# Patient Record
Sex: FEMALE | Race: WHITE
[De-identification: names, ages, dates, MRNs, and addresses within clinical notes are randomized per-mention and may not be internally consistent; named-entity substitution may affect disease eponyms.]

---

## 2020-03-28 ENCOUNTER — HOSPITAL ENCOUNTER (OUTPATIENT)
Dept: HOSPITAL 53 - M SLEEP | Age: 30
End: 2020-03-28
Attending: NURSE PRACTITIONER
Payer: COMMERCIAL

## 2020-03-28 DIAGNOSIS — G47.33: Primary | ICD-10-CM

## 2020-03-31 NOTE — SLEEPCENT
DATE OF STUDY: 03/28/2020

 

NOCTURNAL POLYSOMNOGRAPHY C-PAP TITRATION

 

ORDERED BY:  JUDAH Fulton

 

Nocturnal polysomnography was performed for the titration of pressure therapy in

this patient with a clinical diagnosis of obstructive sleep apnea syndrome

confirmed by home testing revealing an elevated respiratory event index and in

laboratory testing as well.

 

For this study the patient was fit with a ResMed AirFit F20 full face mask of

small size 4 cm of water pressure were applied to the circuit and the lights were

extinguished.

 

7 hours and 54 minutes of data were reviewed.  There were 423 minutes of sleep

identified.  Sleep latency was short at 3 minutes.  REM latency was mildly

prolonged at 109 minutes.  Sleep architecture was good with a four REM cycles.

Overall sleep efficiency 91%.  The electrocardiogram showed a sinus rhythm with

an average heart rate of 84 beats per minute.  EEG showed normal waveforms for

awake and sleep.  Respiratory events were fully palliated with C-PAP of pressure

+10 with some activity noted in the limb leads on this occasion limb movement

arousal index was 8.2.

 

IMPRESSION:

Obstructive sleep apnea syndrome (G47.33).

 

RECOMMENDATION:

Nightly use of pressure therapy 10 cm of water.

 

 

cc:    Pantera Pacheco MD